# Patient Record
Sex: MALE | Race: BLACK OR AFRICAN AMERICAN | Employment: UNEMPLOYED | URBAN - METROPOLITAN AREA
[De-identification: names, ages, dates, MRNs, and addresses within clinical notes are randomized per-mention and may not be internally consistent; named-entity substitution may affect disease eponyms.]

---

## 2023-04-18 ENCOUNTER — HOSPITAL ENCOUNTER (EMERGENCY)
Age: 18
Discharge: HOME | End: 2023-04-18
Attending: EMERGENCY MEDICINE

## 2023-04-18 VITALS
DIASTOLIC BLOOD PRESSURE: 68 MMHG | SYSTOLIC BLOOD PRESSURE: 108 MMHG | RESPIRATION RATE: 18 BRPM | HEART RATE: 63 BPM | BODY MASS INDEX: 25.77 KG/M2 | TEMPERATURE: 97.6 F | OXYGEN SATURATION: 100 % | HEIGHT: 70 IN | WEIGHT: 180 LBS

## 2023-04-18 DIAGNOSIS — F11.90 OPIOID USE: ICD-10-CM

## 2023-04-18 DIAGNOSIS — R11.0 NAUSEA WITHOUT VOMITING: Primary | ICD-10-CM

## 2023-04-18 PROCEDURE — 99283 EMERGENCY DEPT VISIT LOW MDM: CPT

## 2023-04-18 PROCEDURE — 74011250636 HC RX REV CODE- 250/636: Performed by: EMERGENCY MEDICINE

## 2023-04-18 RX ORDER — ONDANSETRON 4 MG/1
4 TABLET, ORALLY DISINTEGRATING ORAL
Status: COMPLETED | OUTPATIENT
Start: 2023-04-18 | End: 2023-04-18

## 2023-04-18 RX ORDER — ONDANSETRON 4 MG/1
4 TABLET, ORALLY DISINTEGRATING ORAL
Qty: 20 TABLET | Refills: 0 | Status: SHIPPED | OUTPATIENT
Start: 2023-04-18

## 2023-04-18 RX ADMIN — ONDANSETRON 4 MG: 4 TABLET, ORALLY DISINTEGRATING ORAL at 03:22

## 2023-04-18 NOTE — ED NOTES
Pt presents to ED ambulatory complaining of nausea. Pt is alert and oriented x 4, RR even and unlabored, skin is warm and dry. Assessment completed and pt updated on plan of care. Call bell in reach. Pt very somnolent during assessment, ER MD would like to monitor and send home when more awake. Emergency Department Nursing Plan of Care       The Nursing Plan of Care is developed from the Nursing assessment and Emergency Department Attending provider initial evaluation. The plan of care may be reviewed in the ED Provider note.     The Plan of Care was developed with the following considerations:   Patient / Family readiness to learn indicated by:verbalized understanding  Persons(s) to be included in education: patient  Barriers to Learning/Limitations:No    Signed     Vitor Ibanez RN    4/18/2023   3:24 AM

## 2023-04-18 NOTE — ED PROVIDER NOTES
[facility] EMERGENCY DEPT  EMERGENCY DEPARTMENT ENCOUNTER       Pt Name: Yash Cordova  MRN: [id number]  [place] 2005  Date of evaluation: 4/18/2023  Provider: Ankit Alfaro MD   PCP: None  Note Started: 3:18 AM 4/18/23     CHIEF COMPLAINT       Chief Complaint   Patient presents with    Nausea        HISTORY OF PRESENT ILLNESS: 1 or more elements      History From: patient, History limited by:  none     Yash Cordova is a 21 y.o. male who presents via ems with chief complaint of nausea. States it started at 9 PM, 6 hours ago. Denies vomiting, diarrhea, abdominal pain, fever. States he is in [place] visiting a friend and he lives in [facility]. Patient is asking for food. Nursing Notes were all reviewed and agreed with or any disagreements were addressed in the HPI. REVIEW OF SYSTEMS        Positives and Pertinent negatives as per HPI. PAST HISTORY     Past Medical History:  No past medical history on file. Past Surgical History:  No past surgical history on file. Family History:  No family history on file. Social History: Allergies: Allergies   Allergen Reactions    Penicillins Unknown (comments)       CURRENT MEDICATIONS      Discharge Medication List as of 4/18/2023  4:53 AM          SCREENINGS               No data recorded         PHYSICAL EXAM      ED Triage Vitals [04/18/23 0310]   ED Encounter Vitals Group      /80      Pulse (Heart Rate) 63      Resp Rate 16      Temp 97.6 °F (36.4 °C)      Temp src       O2 Sat (%) 100 %      Weight 180 lb      Height 5' 10\"        Physical Exam  Vitals and nursing note reviewed. Constitutional:       Appearance: Normal appearance. He is well-developed. Comments: Somnolent. He is arousable and will answer questions with stimulation  Malodorous and disshevelled   HENT:      Head: Normocephalic and atraumatic.       Mouth/Throat:      Mouth: Mucous membranes are moist.   Eyes:      Extraocular Movements: Extraocular movements intact. Conjunctiva/sclera: Conjunctivae normal.      Comments: Pinpoint pupils   Cardiovascular:      Rate and Rhythm: Normal rate and regular rhythm. Pulmonary:      Effort: Pulmonary effort is normal. No accessory muscle usage or respiratory distress. Abdominal:      General: Abdomen is flat. Palpations: Abdomen is soft. Tenderness: There is no abdominal tenderness. Musculoskeletal:         General: Normal range of motion. Cervical back: Normal range of motion. Skin:     General: Skin is warm and dry. Neurological:      Mental Status: He is alert and oriented to person, place, and time. Psychiatric:         Behavior: Behavior normal.         Thought Content: Thought content normal.        DIAGNOSTIC RESULTS   LABS:     No results found for this or any previous visit (from the past 12 hour(s)). EKG: If performed, independent interpretation documented below in the MDM section     RADIOLOGY:  Non-plain film images such as CT, Ultrasound and MRI are read by the radiologist. Plain radiographic images are visualized and preliminarily interpreted by the ED Provider with the findings documented in the MDM section. Interpretation per the Radiologist below, if available at the time of this note:     No results found. PROCEDURES   Unless otherwise noted below, none  Procedures       EMERGENCY DEPARTMENT COURSE and DIFFERENTIAL DIAGNOSIS/MDM   Vitals:    Vitals:    04/18/23 0310 04/18/23 0400 04/18/23 0441 04/18/23 0444   BP: 120/80 (!) 112/58 108/68    Pulse: 63      Resp: 16 18 18 18   Temp: 97.6 °F (36.4 °C)      SpO2: 100% 99% 100% 100%   Weight: 81.6 kg (180 lb)      Height: 5' 10\" (1.778 m)           Patient was given the following medications:  Medications   ondansetron (ZOFRAN ODT) tablet 4 mg (4 mg Oral Given 4/18/23 0322)       Medical Decision Making  Is exhibiting signs of opioid toxidrome. High suspicion patient is experiencing homelessness.   Given his concerns regarding opioid toxidrome, will monitor for signs of respiratory depression before discharge. Regarding nausea, vital signs are stable, patient was without abdominal pain or vomiting or clinical signs of dehydration. Low suspicion for emergent process. Will treat symptomatically    Risk  Prescription drug management. **PLEASE SEE ED COURSE DETAILS BELOW FOR FURTHER MDM DETAILS:         FINAL IMPRESSION     1. Nausea without vomiting    2. Opioid use          DISPOSITION/PLAN   [facility] Johann Trevino's  results have been reviewed with him. He has been counseled regarding his diagnosis, treatment, and plan. He verbally conveys understanding and agreement of the signs, symptoms, diagnosis, treatment and prognosis and additionally agrees to follow up as discussed. He also agrees with the care-plan and conveys that all of his questions have been answered. I have also provided discharge instructions for him that include: educational information regarding their diagnosis and treatment, and list of reasons why they would want to return to the ED prior to their follow-up appointment, should his condition change. PATIENT REFERRED TO:  Follow-up Information       Follow up With Specialties Details Why [id number]  Schedule an appointment as soon as possible for a visit   [place] [id number] [facility] to   [address] [facility]  [phone]              DISCHARGE MEDICATIONS:  Discharge Medication List as of 4/18/2023  4:53 AM            DISCONTINUED MEDICATIONS:  Discharge Medication List as of 4/18/2023  4:53 AM          I am the Primary Clinician of Record. Sarah Mata MD (electronically signed)    (Please note that parts of this dictation were completed with voice recognition software.  Quite often unanticipated grammatical, syntax, homophones, and other interpretive errors are inadvertently transcribed by the computer software. Please disregards these errors.  Please excuse any errors that have escaped final proofreading.)

## 2023-04-18 NOTE — ED NOTES
Discharge instructions were given to the patient by Gretchen Lepe RN. The patient left the Emergency Department ambulatory, alert and oriented and in no acute distress with 1   prescriptions. The patient was encouraged to call or return to the ED for worsening issues or problems and was encouraged to schedule a follow up appointment for continuing care. The patient verbalized understanding of discharge instructions and prescriptions, all questions were answered. The patient has no further concerns at this time.